# Patient Record
Sex: FEMALE | Race: WHITE
[De-identification: names, ages, dates, MRNs, and addresses within clinical notes are randomized per-mention and may not be internally consistent; named-entity substitution may affect disease eponyms.]

---

## 2020-08-10 NOTE — EKG
[facility]
[place]   [address]                     ELECTROCARDIOGRAM REPORT      
_______________________________________________________________________________
 
Name:       MELONY BOYD                Room #:         355-       ADM IN  
M.R.#:      [id number]                       Account #:      [id number]  
Admission:  20    Attend Phys:    Jessy Garces MD   
Discharge:              Date of Birth:  [birth year]  
                                                          Report #: [id number]
                                                                    [id number]
_______________________________________________________________________________
THIS REPORT FOR:  
 
cc:  Henry Mendez MD, Thomas P. MD Lundgren,Addy MARSHALL MD [facility]                                        ~
THIS REPORT FOR:   //name//                          
 
                         [facility] ED
                                       
Test Date:    2020               Test Time:    16:45:53
Pat Name:     MELONY BOYD             Department:   
Patient ID:   [id number]            Room:         [facility]
Gender:       F                        Technician:   Lilia
:          1950               Requested By: Amish Brown
Order Number: [id number] MD:   Addy Berg
                                 Measurements
Intervals                              Axis          
Rate:         122                      P:            78
MD:           137                      QRS:          42
QRSD:         92                       T:            77
QT:           305                                    
QTc:          435                                    
                           Interpretive Statements
Sinus tachycardia
Occasional supraventricular complexes
Compared to ECG 2017 01:24:18
Atrial premature complex(es) now present
Sinus tachycardia is now present
Electronically Signed On 8- 8:46:20 CDT by Addy Berg
[email or web address]
 
 
 
 
 
 
 
 
 
 
 
 
 
 
  <ELECTRONICALLY SIGNED>
   By: Addy Berg MD, [facility]   
  08/10/20     0846
D: 20 1645                           _____________________________________
T: 20 1645                           Addy Berg MD, [facility]     /EPI

## 2020-08-10 NOTE — NUR
PT WAS SEEN TODAY FOR THE FIRST TIME, PT'S BLOOD PRESSURE WAS LOW THIS AM VIA
MACHINE, SO RN RETOOK THE PT'S HR AND BP MANUALLY; WHICH /75 AND 92HR.
NO CONCERNS FROM THE PT, SHE WAS CURIOUS ABOUT WHEN THE COVID TEST WILL
POPULATE, SPOKE WITH DIAO THIS AM, PER STAFF, CURRENT PCR TEST IS DOWN AND
IS BEING PROCESSED AT [facility]; WHICH TAKES ABOUT 24HRS. COMMUNICATED
THIS INFORMATION TO THE PT. PT;S DAUGHTERS CALLED THIS AM HOPING TO RETREIVE
INFORMATION, PER PT IT IS OKAY TO RELEASE INFORMATION, PT'S FAMILY STATED
UNDERSTANDING OF CURRENT CARE REGIMEN AND VERBALLY STATED GRATITUDE FOR
CURRENT CARE AND FUTURE CARE PLANS. WILL CONTINUE TO MONITOR FOR PROPER
OXYGENATION AND UPDATE AS NEEDED

## 2020-08-10 NOTE — NUR
PT ARRIVED ON UNIT FROM ER AT 0400.  COMES FROM HOME--ADMITTED WITH
SOA--COUGH--SWABBED FOR R/O.  AMBULATES TO BATHROOM WITHASSIST X1.  TYLENOL
PROVIDING PAIN RELIEF.  RESTING COMFORTABLY.  NO NEEDS VOICED.  CALL LIGHT
WITHIN REACH.  FREQUENT OBSERVATION.

## 2020-08-11 NOTE — NUR
PT UP TO BATHROOM SEVERAL TIME. PT TOLERATING PO WELL AND REMAINS ON 3LNC.
SECOND COVID TEST IS NEGATIVE. WILL CONTINUE TO ASSESS.

## 2020-08-11 NOTE — NUR
PT AOX4. PT REPORTS PAIN IN RIGHT RIBS, WORSENED BY COUGHING. PT RECEIVING PRN
PO APAP Q6HR AND HAS PRN PO NORCO Q6HR AVAILABLE. PT AMBULATING TO BATHROOM
WITH X1 ASSIST, NOTED TO HAVE UNSTEADY GAIT AT TIMES, WALKER TO BE PROVIDED.PT
TOLERATING PO INTAKE OF FLUIDS WITHOUT ISSUE. PT CONTINUES WITH 3L O2 VIA NC,
NOTED TO HAVE SOB WITH EXERTION OR ON ROOM AIR. PT REPORTS ANXIETY, PT
RECEIVING PRN PO ATIVAN Q8HR. FREQUENT REPOSITIONING ENCOURAGED. PT NOTED TO
SHIFT INDEPENDENTLY WHILE IN BED. PT ENCOURAGED TO NOTIFY STAFF FOR ALL NEEDS.
CALL LIGHT WITHIN REACH, BED ALARM ON, BED IN LOWEST POSITION, WILL CONTINUE
TO MONITOR.

## 2020-08-11 NOTE — NUR
INITIAL ASSESSMENT:
SW reviewed chart and spoke with nursing and attending physician. Pt was
admitted from home due to pneumonia/acute respiratory failure. Pt placed in
Enhanced Isolation to r/o COVID-19. Pt's first test was negative. Awaiting
repeat test results. SW received call from pt's dtr, Sofía Briones
([phone]). Per Sofía she has provided her contact info to the nursing
staff, but it is not listed on pt's chart. Sofía would like an update. SW
explained that consent from the pt will need to be obtained, to add Sofía to
her authorized contact list. Sofía verbalized understanding. SW spoke with pt
via phone.  Introduced role of SW. Pt is  alert/orientated. Pt reports she
lives at home with her . Prior to admission, she was independent with
ADLs.  Pt does have a cane to  assist as needed with ambulation. Pt states
there are stairs in her house that she is able to navigate. Pt's PCP is Dr. Henry Mendez. Pt has used home health in the past, but unsure of agency. No hx
of post-acute placement. Pt's goal is to return home when medically stable. Pt
authorized Sofía to be on her contact list. Info placed in [employer]. SW
updated nursing, who placed call to Sofía to provide update. SW is following
to assist as needed with discharge planning.

## 2020-08-12 NOTE — 2DMMODE
[facility]
[place]   [address]                   2 D/M-MODE ECHOCARDIOGRAM     
_______________________________________________________________________________
 
Name:       MELONY BOYD                Room #:         204-P       ADM IN  
M.R.#:      [id number]                       Account #:      [id number]  
Admission:  20    Attend Phys:    Jessy Garces MD   
Discharge:              Date of Birth:  [birth year]  
                                                          Report #: [id number]
                                                                    [id number]
_______________________________________________________________________________
THIS REPORT FOR:  
 
cc:  Henry Mendez MD, Thomas P. MD Lammoglia, Francisco J. MD                                        
                                                                       ~
 
--------------- APPROVED REPORT --------------
 
 
Study performed:  2020 10:23:25
 
EXAM: Comprehensive 2D, Doppler, and color-flow 
Echocardiogram 
Patient Location: Bedside   
Room #:  204     Status:  routine
 
      BSA:         1.62
HR: 90 bpm BP:          152/79 mmHg 
Rhythm: NSR     
 
Other Information 
Study Quality: Adequate
 
Indications
Short of breath, cough, CHF.
Hx: HTN
 
2D Dimensions
RVDd:  34.14 mm  
IVSd:  9.89 (7-11mm) LVOT Diam:  19.77 (18-24mm) 
LVDd:  42.38 mm  
PWd:  9.15 (7-11mm) 
LVDs:  32.72 (25-40mm) 
Aortic Root:  33.28 mm 
 
Volumes
Left Atrial Volume (Systole) 
Single Plane 4CH:  23.09 mL Single Plane 2CH:  39.31 mL
    LA ESV Index:  22.00 mL/m2
 
Aortic Valve
AoV Peak Yogesh.:  1.49 m/s 
AO Peak Gr.:  8.84 mmHg  LVOT Max P.94 mmHg
    LVOT Max V:  1.22 m/s
KARINA Vmax: 2.51 cm2  
 
 
[facility]
[address]
[place]  [address]
Phone:  [phone]                    2 D/M-MODE ECHOCARDIOGRAM     
_______________________________________________________________________________
 
Name:            MELONY BOYD                Room #:        204-P       [facility] [place]
.R.#:           [id number]          Account #:     [id number]  
Admission:       20         Attend Phys:   PAMELA Jaimes
Discharge:                  Date of Birth: [birth year]  
                         Report #:      [id number]
        [id number]
_______________________________________________________________________________
 
Mitral Valve
    E/A Ratio:  1.2
    MV Decel. Time:  174.86 ms
MV E Max Yogesh.:  1.33 m/s 
MV A Yogesh.:  1.11 m/s  
MV PHT:  50.71 ms  
IVRT:  48.44 ms   
 
Pulmonary Valve
PV Peak Yogesh.:  1.08 m/s PV Peak Gr.:  4.64 mmHg
 
Pulmonary Vein
P Vein S:    0.50 m/s P Vein A:  0.41 m/s
P Vein D:   0.66 m/s P Vein A Dur.:  79.6 msec
P Vein S/D Ratio:  0.76 
 
Tricuspid Valve
TR Peak Yogesh.:  3.03 m/s  RAP Estimate:  10.00 mmHg
TR Peak Gr.:  37.00 mmHg 
    PA Pressure:  47.00 mmHg
 
Left Ventricle
The left ventricle is normal size. There is normal LV segmental wall 
motion. There is normal left ventricular wall thickness. Left 
ventricular systolic function is normal. LVEF is 55-60%. Moderate 
diastolic dysfunction is present (pseudonormal filling).
 
Right Ventricle
The right ventricle is normal size. The right ventricular systolic 
function is normal.
 
Atria
The left atrium size is normal. The right atrium size is 
normal.
 
Aortic Valve
The aortic valve is normal in structure. No aortic regurgitation is 
present. There is no aortic valvular stenosis.
 
Mitral Valve
The mitral valve is normal in structure. Moderate mitral 
regurgitation.
 
Tricuspid Valve
The tricuspid valve is normal in structure. Trace tricuspid 
 
 
[facility]
[address]
[place]  [address]
Phone:  [phone]                    2 D/M-MODE ECHOCARDIOGRAM     
_______________________________________________________________________________
 
Name:            DEBMELONY                Room #:        204-P       [facility] [place]
.R.#:           [id number]          Account #:     [id number]  
Admission:       20         Attend Phys:   PAMELA Jaimes
Discharge:                  Date of Birth: [birth year]  
                         Report #:      [id number]
        [id number]
_______________________________________________________________________________
regurgitation. Estimated PAP is 45-50mmHg.
 
Pulmonic Valve
Pulmonic valve is not well visualized.
 
Great Vessels
The aortic root is normal in size. Ascending aorta is not well 
visualized. IVC is dilated and collapses partially with 
inspiration.
 
Pericardium
There is no pericardial effusion.
 
<Conclusion>
The left ventricle is normal size.
LVEF is 55-60%.
The aortic valve is normal in structure.
The mitral valve is normal in structure.
Moderate mitral regurgitation.
The tricuspid valve is normal in structure.
Trace tricuspid regurgitation. Estimated PAP is 45-50mmHg.
Pulmonic valve is not well visualized.
There is no pericardial effusion.
 
 
 
 
 
 
 
 
 
 
 
 
 
 
 
 
 
 
 
 
 
  <ELECTRONICALLY SIGNED>
   By: Alberto Moses MD    
  20     1103
D: 20 1103                           _____________________________________
T: 20 1103                           Alberto Moses MD      /INF
none

## 2020-08-12 NOTE — NUR
ASSUMED CARE FROM DAY SHIFT PT ALERT AND ORIENTED X 4 UP TO BSC FREQ . PO
MEDICATION TAKEN WITH SNACK , PT THEN WENT TO SLEEP AND WOKE UP CONFUSED
PACKING UP BELONGING TO LEAVE HOSPITAL, PT REORIENTED TO PLACE AND
SITUATION.PT CALLED DAUGHTER, THEN PT APPEARS TO BECOME CALM AND COOPERATIVE .
PT THEN CLIMB IN BED AND FELL ASLEEP. PT TO TRANFER TO CCU.

## 2020-08-12 NOTE — NUR
ASSESSMENT AS CHARTED.  MEDS AS PER MAR.  UP TO THE BATHROM WITH STBY ASSIST.
PT WITH O2 INSIUT AT TIME OF AMBULATION.  NIC DIET AND FLUIDS WITH NO CO'S OF
NAUSEA. PT WITH CO'S OF RIB PAIN FROM COUGHING GIVEN HYDROCODONE WITH GOOD
RELIEF.  PATIENT GIVEN LASIX WITH GOOD OUTPUT.  ECHO COMPLETED THIS AM.
DAUGHTER AT THE BEDSIDE FOR THE DAY.  MEDS AS PER MAR.  NO CO'S AT THE PRESENT
TIME.

## 2020-08-13 NOTE — NUR
ASSESSMENTS AS CHARTED, MEDS CHARTED AS GIVEN. PATIENT RESTING IN ROOM DURIMG
SHIFT. PATIENT RECEIVED TWO IV ANTIBIOTICS THEN THE IV BEGAN TO HURT THE
PATIENT. A NEW IV WAS STARTED IN THE LEFT FOREARM. A SMALL SKIN TEAR HAPPENED
AS THE OLD TEGADERM WAS REMOVED. PATIENT IS ALSO ON LASIX THERAPY. C/O PAIN IN
LEFT RIB AREA FROM COUGHING. HYDROCODONE GIVEN FOR PAIN. PATIENT USING BSC.
FALL PRECAUTIONS IN PLACE DURING SHIFT.

## 2020-08-13 NOTE — NUR
ASSESSMENT AS CHARTED. PT ALERT AND ORIENTED. VSS. REPORT HAVING RIB PAIN WITH
DEEP BREATHING. RT TREATMENT PROVIDED AS ORDERED. FAMILY UPDATED ON PT'S
PROGRESS. WILL CONTINUE TO MONITOR.

## 2020-08-14 NOTE — NUR
ASSESSMENT AS CHARTED. PT ALERT AND ORIENTED. VSS. PRN PAIN MED GIVEN WITH
PARTIAL RELIEF. RT TREATMENT PROVIDED AS ORDERED. DAUGHTER UDATED ON PT'S
PROGRESS. PT PROGRESSING SLOWLY TOWARDS DISCHARGE GOAL.

## 2020-08-14 NOTE — NUR
If patient is to discharge over weekend and HH ordered.
Call [facility] to alert of dc. alert new referral.
Fax orders and H/P
ph  [phone]
fax [phone]

## 2020-08-14 NOTE — NUR
[facility] 1900. PT/VITALS STABLE. INTERMITTENT NON  CARDIAC CHEST PAIN DUE
TO EXCESSIVE COUGHING. NO DISTRESS NOTED. SR ON MONITOR . PROGRESSING WELL
WITH POC. O2 DECREASED FROM 4-2LNC THIS AM BY RT AND PT SEEMS TO BE TOLERATING
WELL. PROGRESSING WELL WITH POC. PLAN IS POSSIBLE DISCHARGE WITHIN THE NEXT
FEW DAYS. WILL CONTINUE TO MONITOR AND FOLLOW WITH POC

## 2020-08-15 NOTE — NUR
PATIENT UP TO TOILET WITH O2 WITHOUT ASSIST; PATIENT HAD BURST OF SVT AT NOC;
PATIENT IS ON 2L N/C WITH WHEEZING IN UPPER AND LOWER AIRWAYS; EARLIER IN THE
NOC WHEEZING HEARD LLL AND FINE CRACKLES IN RLL. PATIENT C/O OF PAIN IN THE
LLABDO IN EARLY AM, PAIN MEDS GIVEN AND PAIN RESOLVED. PATIENT IS PROGRESSING
WELL TOWARDS GOALS AND DISCHARGE.

## 2020-08-15 NOTE — NUR
ASSESSMENT AS CHARTED. PT ALERT AND ORIENTED VSS. SOB NOTED WITH ACTIVITY. RT
TREATMENT PROVIDED AS ORDERED. DAUGHTER AT THE BEDSIDE. PROGRESSING SLOWLY
TOWARDS DISCHARGE GOAL. WILL CONTINUE TO MONITOR.

## 2020-08-16 NOTE — NUR
PATIENT ADMITTED 8/9/20; PATIENT IS VERY ANXIOUS TO DISCHARGE TO HOME.
DURING NOC SHIFT PATIENT'S SYSTOLIC WAS IN -180s/80-90s BUT AT 0400 VS
WERE 150s/80s.
WHEEZING IN UPPER AIRWAYS BUT IMPROVED BY MORNING TO CTA LOWER AIRWAYS
WHEEZING AND FINE CRACKLES CAN BE HEARD. PATIENT AMBULATES TO COMMODE EASILY.
 
PATIENT IS PROGRESSING TOWARD GOALS SLOWLY. CONTINUE TO MONITOR BREATHING AND
ASSESS FOR DISCHARGE.

## 2020-08-16 NOTE — NUR
ASSESSMENT AS CHARTED. PT ALERT AND ORIENTED. VSS. REPORT FEELING AND
BREATHING BETTER TODAY. SEEN BY DR. KIM. ORDERS GIVEN TO DISCHARGE PT TO
H/H. PT LEFT THE FACILITY ACCOMPANIED BY THE DAUGHTER. ORDERS FAXED TO [facility] AND PROVID[facility] [employer]

## 2021-10-18 NOTE — NUR
VAT CONSULTED FOR CVAD. RIGHT IJ IS NONCOMPRESSABLE BUT LEFT IJ WAS WIDELY
PATENT WITH USG. 6FR 25CM TL POWER JACC INSERTED X 1 STICK
TO 6CM EXTERNAL. WITH BRISK
BR. PT TOLERATED WELL. STAT CXR DONE,CONFIRMED PLACEMENT AT [facility]. RELEASED FOR
IMMEDIATE USE PER PROTOCOL TO RN

## 2021-10-19 NOTE — NUR
PT DAUGHTER, SHAMAR GREENWOOD, CALLED UNIT TO GET UPDATE ON HER MOTHER. THIS RN
PROVIDED UPDATED VITAL SIGNS, MEDICATIONS PER REQUEST. SHAMAR STATED SHE DOES
NOT WANT PT ON REMDESIVIR "DUE TO HER PERSONAL RESEARCH IN THE SIDE EFFECTS IT
HAS ON WHAT IT DOES TO ASTHMA PATIENTS"
THIS RN PAGED DR SALAZAR FOR UPDATE.

## 2021-10-19 NOTE — NUR
INITIAL ASSESSMENT:
SW reviewed chart and spoke with nursing and attending physician. Pt was
admitted from home due to COVID pneumonia. Pt placed in Enhanced Isolation. Pt
has not received a COVID vaccine. Pt is afebrile and requiring 10L of O2. Pt
is on IV abx and has been started on Remdesivir. ID consulted. SW placed call
to pt's room. No answer. Per chart, pt is alert/orientated. Pt lives at home
with family. Pt has used [facility]  in the past. Pt's PCP is listed
as Dr. Henry Mendez. Therapy evals to be ordered when pt is able to
participate. SW is following to assist as needed with discharge planning.

## 2021-10-19 NOTE — NUR
PT ADMITTED TO 3W FROM ER, ARRIVED VIA CART, ADMITTED TO ROOM 356. ADMISSION
ASSESSMENTS COMPLETED. PT ARRIVED TO UNIT ON 3L O2 PER NASAL CANULA, TITRATED
UP TO 5L. AROUND
APPROXIMATELY 0345, PT'S O2 DROPPED TO 82%. PT NOTED TO BE SLEEPING AND ON
ROOM AIR, HAD
REMOVED NASAL CANULA. WITHIN 5 MINUTES OF REPLACING NASAL CANULA, O2 HENRY TO
93%. ASSESSMENTS AND OBSERVATIONS ONGOING.

## 2021-10-19 NOTE — NUR
THIS RN SPOKE WITH DR SALAZAR EARLIER IN SHIFT, DR SALAZAR STATES THAT HE WILL
FURTHER DISCUSS REMDESIVER WITH PT.
PT STATES "THAT'S THAT MEDICATION THAT MY DAUGHTER DOES NOT WANT ME TO TAKE. I
WANT TO TAKE IT. I THINK I FEEL BETTER THAN I DID THIS MORNING. I WOULD LIKE
TO KEEP TAKING IT"

## 2021-10-19 NOTE — NUR
CNA REPORTED PT WITH INCREASED CONFUSION DURING TRANSFER TO AND FROM BEDSIDE
COMMODE. UPON ASSESSMENT, O2 SAT NOTED TO BE 85% ON 3L. OXYGEN WAS TITRATED UP
TO 5L, PT PROVIDED EDUCATION AND ENCOURAGEMENT ON BREATHING TECHNIQUES, AND O2
SAT IMPROVED TO 93/94%. PT PLACED ON CONTINUOUS O2 MONITORING. TM

## 2021-10-20 NOTE — NUR
SW reviewed chart and spoke with nursing and attending physician. Pt remains
in Enhanced Isolation due to COVID. Pt is afebrile and on 6L of O2. Pt is on
IV abx and Remdesivir. SW placed call to pt's room. No answer. KATH will
continued to follow up with pt to obtain info for assessment and to discuss
discharge planning.

## 2021-10-20 NOTE — NUR
PT IS SLOWLY PROGRESSING TOWARD GOALS. PT'S O2 SATS REMAINED AROUND 90-94% ON
5L PER NC. SHE IS UP TO BSC WITH ASSIST X1-2, AND DOES DESATURATE DURING AND
AFTER TRANSFERS. SHE REQUIRED TITRATION UP TO 7L AFTER TRANSFER, THOUGH
RESPONDED QUICKLY AND WAS ABLE TO BE TITRATED BACK TO 5L WITHIN MINUTES AND
REMAINED AROUND 93%. SHE CONTINUES TO HAVE LOOSE STOOLS, X2 THIS SHIFT. CDIFF
SAMPLE RESULTED AS NEGATIVE.

## 2021-10-20 NOTE — HC
[facility]
[address]
[place]   [address]                     CONSULTATION                  
_______________________________________________________________________________
 
Name:       MELONY BOYD                Room #:         356-P       ADM IN  
..#:      [id number]                       Account #:      [id number]  
Admission:  10/18/21    Attend Phys:    Arun Gonsales MD    
Discharge:              Date of Birth:  [birth year]  
                                                          Report #: [id number]
                                                                    [id number] 
_______________________________________________________________________________
THIS REPORT FOR:  
 
cc:  Henry Mendez MD, Thomas P. MD Barry, Joseph W. MD                                           ~
 
DATE OF SERVICE: 10/19/2021
 
INFECTIOUS DISEASE CONSULTATION
 
ATTENDING PHYSICIAN:  Dr. Gonsales.
 
REASON FOR EVALUATION:  COVID-19 infection, complicated by pneumonitis and 
respiratory failure.
 
HISTORY OF PRESENT ILLNESS:  Chart reviewed, the patient examined.  A 
71-year-old with history of hypertension, COPD, has been ill for the last 
several days.  She notes she has had diarrhea, although denies significant 
abdominal pain.  She had experienced some dyspnea as well.  Due to lack of 
resolution of symptoms and signs presented to the Emergency Room.  Evaluation 
was undertaken including coronavirus testing, which was positive.  Initial CBC 
showed a mild elevation of the white cell count.  Chest x-ray did confirm 
bilateral interstitial pneumonitis, did have some renal insufficiency with 
creatinine of 1.9.  Estimated filtration rate of 26, albumin was 3.3.  Lactic 
acid mildly elevated at 2.1.  Procalcitonin of 7.42.  CT abdomen and pelvis was 
undertaken.  There is question of some ileus.  Urinalysis was generally 
unremarkable.  Blood cultures collected at the time of admission are sterile 
thus far.  She was initiated on a combination therapy, initially had opted not 
to start remdesivir.  She has subsequently agreed to it, has been on therapy 
with Zosyn and vancomycin as well, did receive a dose of azithromycin.
 
ALLERGIES:  None known.
 
CURRENT MEDICATIONS:  Cholecalciferol, thiamine, vancomycin, montelukast, 
guaifenesin, dexamethasone, ascorbic acid, pantoprazole, Zosyn, zinc, 
metoprolol, remdesivir.
 
PAST MEDICAL HISTORY:  As described above, hypertension, COPD, psoriasis, 
history of tobacco abuse, anxiety.
 
SOCIAL HISTORY:  Former smoker, occasional ethanol, no illicit drug use.
 
FAMILY HISTORY:  Noncontributory.
 
REVIEW OF SYSTEMS:  Otherwise, unrevealing.  Denies any fevers.  Has had some 
chills.
 
 
 
[address]   [address]                     CONSULTATION                  
_______________________________________________________________________________
 
Name:       MELNOY BODY                Room #:         [address] [place] [address] [place]  
[facility].#:      [id number]                       Account #:      [id number]  
Admission:  10/18/21    Attend Phys:    Arun Gonsales MD    
Discharge:              Date of Birth:  [birth year]  
                                                          Report #: [id number]
                                                                    [id number] 
_______________________________________________________________________________
 
 
PHYSICAL EXAMINATION:
GENERAL:  She appears chronically ill, undernourished.  She is generally lucid, 
moderate distress.
VITAL SIGNS:  Temperature 98.5, pulse 90, respirations 22, blood pressure 98/53.
SKIN:  Warm, dry, no rashes.
HEENT:  Normocephalic.  Extraocular muscles intact.
NECK:  Supple.  Nasal cannula in place.
LUNGS:  Few scattered coarse breath sounds bilaterally.
HEART:  Regular, borderline tachycardic.  I do not appreciate a murmur.
ABDOMEN:  Soft, nontender.
EXTREMITIES:  No cyanosis.
GENITOURINARY AND RECTAL:  Deferred.
 
LABORATORY DATA:  Blood cultures described above, negative thus far.  Most 
recent electrolytes, sodium 140, potassium 3, chloride 108, bicarbonate is 19, 
anion gap of 13, BUN and creatinine 24 and 0.8, glucose of 121.  Liver functions
otherwise unremarkable.  Albumin 2.1.  Total protein 5.5.  CBC:  White count 
10.5, H and H 12.7 and 38.4, platelets of 201.  Lactic acid 1.7.  Urinalysis 
negative nitrite, negative leukocyte.  CT abdomen and pelvis as noted above, 
colonic ileus, small amount of free fluid in the pelvis, cholelithiasis, patchy 
pneumonitis, bilateral lower lobes, small effusions.
 
ASSESSMENT AND PLAN:  COVID-19 infection, complicated by pneumonitis and 
respiratory failure as manifestations presumably of gastrointestinal related 
complaints with a diarrhea.  Clostridium difficile has been sent.  I will 
continue empiric broad-spectrum antibacterial therapy for presumptive secondary 
bacterial infection also directed therapy against coronavirus with remdesivir, 
corticosteroids, vitamins.  In the event of worsening, consider adding Actemra 
to the regimen.  At this point, she is fairly tenuous.  Continue oxygen support 
as allowed.
 
 
 
 
 
 
 
 
 
 
 
 
  <ELECTRONICALLY SIGNED>
   By: Adriel Oliver MD           
  10/20/21     1157
D: 10/19/21 0940                           _____________________________________
T: 10/19/21 1016                           Adriel Oliver MD             /nt

## 2021-10-21 NOTE — NUR
RT INFORMED NURSE THAT PT'S 02 SATURATION ON 5L NEED TO BE INCREASED TO 10L.
ASSESSED PT, NOTICED WHEEZING AND CRACKLES IN BREATHING.  CALLED NP, RECEIVED
ORDER TO STOP IV MAINTANENCE FLUIDS AND GIVE X1 2OMG LASIX IV PUSH.

## 2021-10-21 NOTE — NUR
RN ASSUMED PT'S CARE AT 0700-1900PM, PT IS A&OX4, PT IS ON O2 5-6L/MIN/NC,
PT'S O2SAT STAY AT 92-95%, BUT PT HAS SOB WITH ACTIVITIES, PT IS CONTINUNG IV
ABX AND TREAT COVID MEDICATIONS, PT HAS 1 TIME DIARRHEA, , PT GETS UP TO BSC
WITH ASSIST, PT'S VS ARE STABLE AT DAY SHIFT.

## 2021-10-22 NOTE — NUR
PT ALERT AND ORIENTED X 4. PT ON 5L NC. PT SBA TO BSC. PT HAD MULTIPLE LOOSE
STOOLS OVERNIGHT. PT VSS. PT'S DAUGHTER, SHAMAR GREENWOOD, CALLED TO GET UPDATE
ON PT'S CONDITION. NURSE UPDATED DAUGHTER PER REQUEST. PT'S DAUGHTER STATED
THAT PT'S SPOUSE VOICED "I WILL CALL  IF I DON'T GET A CALL
FROM THE DOCTOR TOMORROW". WILL NOTIFY DAY RN. WILL CONTINUE TO MONITOR PT.

## 2021-10-22 NOTE — NUR
PT IS ALERT AND ORIENTED X4. PT IS CURRENTLY ON 5L NC AND HAS WHEEZES IN
BILATERAL BASES. SR ON THE MONIOR. PT HAS BEEN UP MULTIPLE TIMES TO THE
BEDSIDE COMMODE WITH DIARRHEA. PT CURRENTLY STATING SHE FEELS LIKE SHE CAN NOT
BREATHE. RT ON UNIT. PAGED PULMONOLOGY. RECEIVED ORDERS FROM DR. ASCENCIO. PAGED
RT FOR BREATHING TREATMENT FOR PT. WILL CONTINUE TO MONITOR.

## 2021-10-22 NOTE — NUR
KATH reviewed chart and spoke with nursing and attending physician. Pt remains
in Enhanced Isolation due to COVID. Pt is afebrile and on 6L of O2. Pt is on
IV abx and Remdesivir. No weekend discharge planned. KATH notified that pt's dtr
is wanting to speak with a physician, as she has not received a call back from
a physician. KATH notified attending physician and provided dtr, Sofía's
contact info. KATH is following to assist as needed with discharge planning.

## 2021-10-23 NOTE — NUR
PT IS ALERT AND ORIENTED X4. PT IS ON 7L NC AND O2 SAT IS 90%-92%. SR ON THE
MONITOR. PT REFUSED MUCINEX. PT HAS BEEN UP SEVERAL TIMES TO THE BEDSIDE
COMMODE WITH DIARRHEA AND HAS PERIODS OF INCONTINENCE IN THE BED. REPLACED
CALCIUM AND POTASSIUM. NO COMPLAINTS OF PAIN OR DISCOMFORT AT THIS TIME. WILL
CONTINUE TO MONTIOR.

## 2021-10-23 NOTE — NUR
PT ALERT AND ORIENTED X 4. PT HAD LOOSE STOOLS OVERNIGHT. PT'S DAUGHTER,
SHAMAR GREENWOOD, CALLED AND REQUESTED UPDATE ON PT'S CONDITION. NURSE UPDATED
DAUGHTER PER REQUEST. PT IS SBA TO BSC . PT IS CURRENTLY ON 5L O2 NC. PT'S
VITAL SIGNS ARE STABLE. WILL CONTINUE TO MONITOR.

## 2021-10-24 NOTE — NUR
PT IS SLOWLY PROGRESSING TOWARDS CARE. CURRENTLY ON 7L OF OXYGEN. SOB WITH
EXERTION. UP TO BSC WITH ASSIST. FALL AND ENHANCED PRECAUTIONS IN PLACE.

## 2021-10-24 NOTE — NUR
PT RESTING IN BED WATCHING Polish SOAP OPERA. PT CALLS FOR ASSISTANCE TO BSC.
PT IS INCONTINENT AND LEAKS URINE DURING TRANSFER AND ALSO IN CHUX IN BED. PT
PROVIDED PURWICK. PT DID NOT EAT ANY OF HER PIZZA PROVIDED BY DAUGHTER. O2 PER
NC. LUNGS COARSE. IJ INTACT. PT JOKING WITH STAFF, BLUNT AFFECT. PT STATING
SHE WANTS TO DC HOME TOMORROW. DAUGHTER CALLED FOR UPDATE.

## 2021-10-24 NOTE — NUR
PT RESTING IN BED, WATCHING TV. O2 PER NC 7L. LUNGS COARSE. PURWICK REPLACED.
PT INCONTINENT IN BED. BED ALARM ON. PSORIASIS ON TRUNK AND BLE. PT CHEERFUL
TALKATIVE, SOA WITH WITH REPOSITIONING. PT HAS TRAY OF ONION RINGS AT BEDSIDE
BUT NOT EATING THEM. PT IS DRINKING HER WATER.

## 2021-10-25 NOTE — NUR
Assess due to length of stay.  Admit with COVID+ pneumonia. Appetite has been
very poor past week, less than 30% of meals.  Wt from Aug 2020 125 lb, now 140
lb ?.  Has restricted diet order of heart healthy, will liberalize and offer
oral supplements.  On vitamin pack for covid protocol.  Hope to see
improvement in oral intake as covid treatment continues and pt begins to feel
and breath better.  Low nutrition risk

## 2021-10-25 NOTE — NUR
KATH reviewed chart and spoke with nursing and attending physician. Pt remains
in Enhanced Isolation due to COVID. Pt is afebrile and requiring 7-9L of O2.
Pt is on IV steroids. KATH spoke with pt's dtr, Sofía, via phone to provide
update and discuss discharge plan. Pt and family's preference is for pt to
return home with HH and home O2 if needed. Pt has used [facility]
in the past and would like to use them again. Pt's family is open to SNF
placement if needed, but would prefer pt to return home. Pt's dtr will be
staying with pt after discharge. SW notified [facility] liaison of
new referral. KATH is following to assist as needed with discharge planning.

## 2021-10-26 NOTE — NUR
SW reviewed chart and spoke with nursing and attending physician. Pt remains
in Enhanced Isolation due to COVID. Pt is on 7L and IV steroids. Pt is slowly
progressing towards goals for discharge. [facility] liaison to
follow pt for home health services. KATH is following to assist as needed with
discharge planning.

## 2021-10-26 NOTE — NUR
RN ASSUMED PT'S CARE AT 0700AM, PT IS A&OX4, PT IS ON O2 5L/MIN/NC, PT'S VS
AND O2SAT ARE STABLE BY THIS TIME, PT IS CONTINUINING TREAT COVID MEDICATIONS,
PT DENIES PAIN AND SOB BY THIS TIME.

## 2021-10-26 NOTE — NUR
PROGRESS
 
PT A/O X4, ON 7 TO 9 LITERS O2 VIA NC. LUNGS COARSE AND DIMINISHED, PT'S
BREATHING IS SHALLOW. COUGHING BUT DRY NO SPUTUM PRODUCTION. PT REFUSES
GUAFENESIN D/T STATES IT MAKES HER NAUSEOUS. UP AD PORTILLO, GAIT STEADY ABLE TO
MANAGE EQUIPMENT WITHOUT ANY DIFFICULTY. IJ TO LEFT NECK INTACT GOOD BLOOD
RETURN IN ALL PORTS AND FLUSHES WITHOUT DIFFICULTY. PT REPORTS NECK PAIN FROM
IJ AND TYLENOL GIVEN WITH EFFECT PAIN WENT FROM  DOWN TO A 2 AND PT SLEPT
AFTER. SKIN C/D/I BUTTOCKS A LITTLE RED FROM FREQUENT STOOLING ZYGUARD APPLIED
CONTINUE POC.

## 2021-10-27 NOTE — NUR
RESTING QUIETLY TONIGHT. DENIES PAIN. SHE TENDS TO REFUSE HER GUAIFENESIN DUE
TO (AS SHE STATES) THAT IT HURTS HER STOMACH. RECIVED ORDER FOR A PRN BENZOATE
100 MG. FOR COMPLAINTS OF COUGH AN CONGESION.

## 2021-10-27 NOTE — NUR
PATIENT IS ALERT AND ORIENTED. PATIENT AT THE BEGINNING OF THIS SHIFT WAS ON
5L OF OXYGEN AND HER OXYGEN SATURATION WAS RUNNING BETWEEN 90- 92%. PATIENTS
OXYGEN LEVEL WAS DECREASED TO 4L. PATIENTS OXYGEN SATURATOIN AT THIS TIME IS
90% AND HAS BEEN STEADY AT 90% FOR THE PAST THIRTY MINUTES. PATIENT CONTINUES
TO COMPLAIN OF ABDOMINAL DISCOMFORT IN THE EPIGASTRIC AREA WHEN SHE TAKES
PILLS OR EATS HER FOOD. PATIENT HAS HAD NO REPORTS OF PAIN. PATIENT SAT UP IN
HER CHAIR UNTIL SHE ATE LUNCH. ONCE LUNCH WAS COMPLETED PATIENT WAS ASSISTED
BACK INTO HER BED. PATIENT REPORTS THAT SHE IS 'JUST SO TIRED'. PATIENT WILL
CONTINUE TO BE MONITORED.

## 2021-10-27 NOTE — NUR
KATH reviewed chart and spoke with nursing and attending physician. Pt is slowly
progressing towards goals for discharge. Pt remains in Enhanced Isolation due
to COVID. Pt is afebrile. Pt is requiring 5-8L of O2. Pt is on IV steroids. KATH
placed call to pt's room. No answer. KATH spoke with pt's dtr, Sofía, via
phone. Provided update. Pt's dtr states she is hoping pt will be able to go
home by the end of the week. SW discussed current O2 requirements. Pt's dtr
states that she will be staying with pt at home when she is discharged and
feels comfortable with pt going home on O2. [facility]  is
following. Pt's dtr states that pt is not eating well and is concerned about
her not taking in enough calories. Pt's dtr states it has been difficult not
being able to see pt. SW offered to have nursing staff arrange a virtual visit
with pt. Pt's dtr states that tomorrow around [id number] would work for family
as they will have several family members present. KATH notified pt's nurse. SW
is following to assist as needed with discharge planning.

## 2021-10-28 NOTE — NUR
KATH reviewed chart and spoke with nursing and attending physician. Pt remains
in Enhanced Isolation due to COVID. Pt is afebrile and is on 4L of O2. Pt is
on IV steroids. Discharge home with  and home O2 is anticipated for
tomorrow. KATH updated [facility]  and [facility] liaisons. KATH spoke with
pt's dtr, Sofía, via phone to discuss discharge plan. Sofía states she was
able to have a virtual visit with pt earlier today. Sofía is aware and in
agreement with discharge plan. Pt's dtrs will be staying with pt when she
returns home. Pt will have 24 hour care provided upon her return home. KATH is
following to assist as needed with discharge planning.

## 2021-10-28 NOTE — NUR
pt resting quietly tonight. she has been much more relaxed tonight. continues
on 4 liters n/c. 94 -97% during her resting periods at night. calls for assist
out of bed, if needed. continues to use the pure wick for urine output. her
dtr and her have spoken on the phone and nurse has answered questions when
asked during phone calls in the room.

## 2021-10-28 NOTE — NUR
PATIENT IS ALERT AND ORIENTED THIS SHIFT. HER RESPIRATIONS HAVE BEEN EVEN AND
UNLABORED. LUNG SOUNDS ARE CLEAR BUT DIMINISHED IN ALL FIVE LOBES.
PATIENT CONTINUES TO REPORT
DISCOMFORT IN THE EPIGASTRIC AREA WITH MEALS. PATIENT WAS OFFERED SUPPLEMENT
AND ENCOURAGED TO DRINK SUPPLEMENT WITH ALL MEALS. PATIENT REPORTS THAT SHE
THINKS THE SUPPLEMENTS ARE TO SWEET AND DOESNT LIKE THEM. PATIENT SAT UP IN
HER RECLINER FROM MID MORNING TO AFTER LUNCH. SOFÍAN REPORTS THAT SHE IS
STILL REALLY TIRED BUT STARTING TO FINALLY FEEL BETTER. PATIENT WILL CONTINUE
TO BE MONITORED.

## 2021-10-28 NOTE — NUR
VASCULAR ACCESS NURSE ROUNDING. THIS PATIENT IS NO LONGER ON CONTINUOUS IV
MEDICATIONS. SUGGEST PERIPHERAL IV PLACEMENT AND REMOVAL OF CENTRAL LINE TO
DECREASE THE RISK OF A CENTRAL LINE RELATED BLOOD STREAM INFECTION

## 2021-10-29 NOTE — NUR
Pt. stated she had headache yesterday that went away after taking tylenol. She
slept fair during the night. Assisted to reposition prn for comfort. O2 at
4L/NC with O2 sat in the low 90's at beginning of shift then upper 90's once
she started resting. Daughter Sofía called to get an update on pt.Incontinent
of bladder and bowel. External cath in place. Z guard applied to buttocks.
Cont. on enhanced precaution,afebrile. Making some progress towards care plan
goals.

## 2021-10-29 NOTE — NUR
KATH reviewed chart and spoke with nursing and attending physician. Pt remains
in Enhanced Isolation due to COVID. Pt is afebrile and on 4L of O2. Weekend
discharge anticipated. KATH spoke with pt's dtr, Sofía, at length regarding
discharge plan. Sofía states that they want pt to discharge home tomorrow.
They do not want her to stay until Sunday or past Sunday, as they are
concerned that pt's condition will decline if she stays longer. KATH discussed
DME options. Pt's dtr has ordered a BSC and states that pt will stay in a
recliner. Pt's family does not feel that pt needs a hospital bed at this time.
Pt's family has ordered positioning cushions and will be providing 24 hour
care. Pt's dtr states that if HH has recommendations, they will order what
they recommend. Pt's dtr is aware of pt needing home O2. Pt's dtr is
interested in a w/c. KATH explained that a w/c cannot be delivered over the
weekend, but can be ordered through [facility] if pt's insurance will cover. KATH
updated [facility] liaison and notified of weekend discharge. Will need
rest/exercise oximetry results and script for O2 faxed to [facility]. [facility]. Final discharge ppwk will need to be faxed to
 when available. Pt's family will provide transportation home. KATH updated
pt's nurse and attending physician. Contact info for  and [facility] placed in
pt's discharge summary. KATH is available to assist as needed with discharge
planning.
[facility]--Phone: [phone]   Fax: [phone]
[facility]--Phone: [phone]     Fax: [phone]

## 2021-10-30 NOTE — NUR
WENT THOUGH DISCHARGE INSTRUCTIONS WITH DAUGHTER, SHAMAR AND PT S WELL. THEY
CARE BOTH AWARE ABOUT PT OUT PT APPOINTMENTS. EDUCATED THEM ON HOW TO USE HOME
OXYGEN AND INSTRUCTED TO CALL [facility], ASAP FOR HOME O2 SET UP. PICC LINE
DISCONTINUE, TELE D/C. ALL PT BELONGINGS WITH PT. PT TAKEN DWON VIA WHEEL CARE

## 2021-10-30 NOTE — NUR
Received pt on 5L/NC at start of shift with O2 sat in the low 90's. O2
titrated down to 4L as her O2 sat has stayed up to 96%. Cont. on enhanced
precaution , afebrile. Slept fair during the night. She's hoping to go home
today. SCD's on then requested off around MN so she can sleep. Making some
progress towards discharge goals.

## 2021-10-30 NOTE — NUR
WAS NOTIFIED BY PT'S NURSE THAT THEY REPEATED THE REST/EXERCISE O2 SAT AND PT
QUALIFIES FOR HOME O2. FAXED RESULTS TO [facility] WITH INTAKE AND THEY
WILL DELIVER A PORT TANK WITHIN THE HOUR ALSO NOTIFIED THE UNIT.  DC ORDERS
AND SUMMARY STILL NEED TO BE FAXED TO [facility] FAX:[phone] AND
CALL [phone] TO NOTIFY OF DC.

## 2021-11-15 ENCOUNTER — HOSPITAL ENCOUNTER (OUTPATIENT)
Dept: HOSPITAL 35 - RAD | Age: 71
End: 2021-11-15
Attending: INTERNAL MEDICINE
Payer: COMMERCIAL

## 2021-11-15 DIAGNOSIS — R91.8: ICD-10-CM

## 2021-11-15 DIAGNOSIS — M41.84: ICD-10-CM

## 2021-11-15 DIAGNOSIS — I51.7: ICD-10-CM

## 2021-11-15 DIAGNOSIS — J18.9: Primary | ICD-10-CM

## 2021-11-15 DIAGNOSIS — J44.9: ICD-10-CM

## 2021-12-15 ENCOUNTER — HOSPITAL ENCOUNTER (OUTPATIENT)
Dept: HOSPITAL 35 - RAD | Age: 71
End: 2021-12-15
Attending: INTERNAL MEDICINE
Payer: COMMERCIAL

## 2021-12-15 DIAGNOSIS — J44.9: Primary | ICD-10-CM

## 2021-12-15 DIAGNOSIS — J98.11: ICD-10-CM

## 2021-12-15 DIAGNOSIS — J18.9: ICD-10-CM
